# Patient Record
Sex: MALE | Race: BLACK OR AFRICAN AMERICAN | NOT HISPANIC OR LATINO | URBAN - METROPOLITAN AREA
[De-identification: names, ages, dates, MRNs, and addresses within clinical notes are randomized per-mention and may not be internally consistent; named-entity substitution may affect disease eponyms.]

---

## 2018-12-16 ENCOUNTER — EMERGENCY (EMERGENCY)
Facility: HOSPITAL | Age: 23
LOS: 0 days | Discharge: HOME | End: 2018-12-16
Attending: EMERGENCY MEDICINE
Payer: COMMERCIAL

## 2018-12-16 VITALS
TEMPERATURE: 98 F | RESPIRATION RATE: 18 BRPM | DIASTOLIC BLOOD PRESSURE: 67 MMHG | HEART RATE: 74 BPM | SYSTOLIC BLOOD PRESSURE: 117 MMHG | OXYGEN SATURATION: 99 %
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
## 2018-12-16 DIAGNOSIS — M25.561 PAIN IN RIGHT KNEE: ICD-10-CM

## 2018-12-16 DIAGNOSIS — S80.01XA CONTUSION OF RIGHT KNEE, INITIAL ENCOUNTER: ICD-10-CM

## 2018-12-16 PROCEDURE — 73562 X-RAY EXAM OF KNEE 3: CPT | Mod: 26,RT

## 2018-12-16 PROCEDURE — 99283 EMERGENCY DEPT VISIT LOW MDM: CPT

## 2018-12-16 RX ORDER — IBUPROFEN 200 MG
600 TABLET ORAL ONCE
Qty: 0 | Refills: 0 | Status: COMPLETED | OUTPATIENT
Start: 2018-12-16 | End: 2018-12-16

## 2018-12-16 RX ADMIN — Medication 600 MILLIGRAM(S): at 11:58

## 2018-12-16 RX ADMIN — Medication 600 MILLIGRAM(S): at 11:51
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
## 2018-12-16 NOTE — ED PROVIDER NOTE - CONSTITUTIONAL, MLM
normal... Well appearing, well nourished, awake, alert, oriented to person, place, time/situation and in no apparent distress. Speaking in clear full sentences no nasal flaring no shoulders retractions, appears very comfortable sitting up in the stretcher
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
## 2018-12-16 NOTE — ED PROVIDER NOTE - MEDICAL DECISION MAKING DETAILS
hx, exam, pt is given and explained the xray of right knee reports and placed on ace wrap and ortho referral.

## 2018-12-16 NOTE — ED PROVIDER NOTE - LOWER EXTREMITY EXAM, RIGHT
no effusion  no wound no deformity, tender only when flex the right knee to the tibial tuberosity area

## 2018-12-16 NOTE — ED PROVIDER NOTE - ENMT, MLM
Airway patent, Nasal mucosa clear. Mouth with normal mucosa. Throat has no vesicles, no oropharyngeal exudates and uvula is midline. No hematoma no wound to the scalp or face
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
## 2020-03-12 ENCOUNTER — EMERGENCY (EMERGENCY)
Facility: HOSPITAL | Age: 25
LOS: 1 days | Discharge: HOME | End: 2020-03-12
Attending: EMERGENCY MEDICINE
Payer: COMMERCIAL

## 2020-03-12 VITALS
RESPIRATION RATE: 16 BRPM | DIASTOLIC BLOOD PRESSURE: 85 MMHG | SYSTOLIC BLOOD PRESSURE: 128 MMHG | OXYGEN SATURATION: 99 % | TEMPERATURE: 98 F | WEIGHT: 154.98 LBS | HEART RATE: 75 BPM | HEIGHT: 70 IN

## 2020-03-12 VITALS
OXYGEN SATURATION: 99 % | RESPIRATION RATE: 18 BRPM | TEMPERATURE: 98 F | SYSTOLIC BLOOD PRESSURE: 124 MMHG | HEART RATE: 77 BPM | DIASTOLIC BLOOD PRESSURE: 88 MMHG

## 2020-03-12 PROCEDURE — 99283 EMERGENCY DEPT VISIT LOW MDM: CPT

## 2020-03-12 PROCEDURE — 99282 EMERGENCY DEPT VISIT SF MDM: CPT

## 2020-03-12 RX ORDER — ACETAMINOPHEN 500 MG
650 TABLET ORAL ONCE
Refills: 0 | Status: COMPLETED | OUTPATIENT
Start: 2020-03-12 | End: 2020-03-12

## 2020-03-12 RX ORDER — IBUPROFEN 200 MG
400 TABLET ORAL ONCE
Refills: 0 | Status: COMPLETED | OUTPATIENT
Start: 2020-03-12 | End: 2020-03-12

## 2020-03-12 RX ADMIN — Medication 650 MILLIGRAM(S): at 22:37

## 2020-03-12 RX ADMIN — Medication 400 MILLIGRAM(S): at 22:37

## 2020-03-12 NOTE — ED ADULT NURSE NOTE - OBJECTIVE STATEMENT
pt here with one da of runny nose and a cough.  he believes he has "allergies" and wants "allergy medication"

## 2020-03-12 NOTE — ED PROVIDER NOTE - PATIENT PORTAL LINK FT
You can access the FollowMyHealth Patient Portal offered by [facility] by registering at the following website: [email or web address]. By joining [facility]’s FollowMyHealth portal, you will also be able to view your health information using other applications (apps) compatible with our system.

## 2020-03-12 NOTE — ED PROVIDER NOTE - NSFOLLOWUPINSTRUCTIONS_ED_ALL_ED_FT
Please take Motrin 400 mg and Tylenol 500 mg alternatively every 3 hours for your pain and discomfort or fever.

## 2020-03-12 NOTE — ED PROVIDER NOTE - PHYSICAL EXAMINATION
[Gen]: well appearing, of stated age, no acute distress;  [HENT]: no cervical lymphadenopathy. Oropharynx clear.   [Eyes]: bilateral conjuctival injection.  [Chest]: chest clear to ausculation bilaterally.   [Heart]: RRR S1S2. No murmurs rubs or gallops.   [Abd]: Soft non-tender, no organomegaly.

## 2020-03-12 NOTE — ED PROVIDER NOTE - CROS ED CONS ALL NEG
I have reviewed discharge instructions with the patient. The patient verbalized understanding. Patient left ED via Discharge Method: ambulatory to Home with self. Opportunity for questions and clarification provided. Patient given 1 scripts. To continue your aftercare when you leave the hospital, you may receive an automated call from our care team to check in on how you are doing. This is a free service and part of our promise to provide the best care and service to meet your aftercare needs.  If you have questions, or wish to unsubscribe from this service please call [phone]. Thank you for Choosing our [facility]. negative...

## 2020-03-12 NOTE — ED PROVIDER NOTE - CHPI ED SYMPTOMS NEG
no rhinorrhea, no sore throat, no constipation, no nausea/no diarrhea/no fever/no cough/no shortness of breath/no vomiting

## 2020-03-12 NOTE — ED PROVIDER NOTE - OBJECTIVE STATEMENT
24 year old M with no significant PMHx or significant PSHx presents to ED c/o  sneezing, nasal congestion, and generalized HA x2 days. Tolerating fluids and meals well. Denies fever, chills, sore throat, rhinorrhea, cough, SOB, CP, diarrhea, constipation, vomiting, nausea.  No sick contacts. No recent travel. Denies tobacco and drug use. Occasionally drinks alcohol.

## 2020-03-12 NOTE — ED PROVIDER NOTE - CLINICAL SUMMARY MEDICAL DECISION MAKING FREE TEXT BOX
24 year old M with no pmhx non smoker with viral syndrome vs allergy. Low concern for COVID19. Advised pt to rest and treat symptomatically and f/u if not feeling better.

## 2025-04-10 NOTE — ED ADULT NURSE NOTE - IS THE PATIENT ABLE TO BE SCREENED?
Orders:    gabapentin (Neurontin) 600 mg tablet; Take 1 tablet (600 mg) by mouth 2 times a day.     No